# Patient Record
Sex: FEMALE | Race: WHITE | NOT HISPANIC OR LATINO | URBAN - METROPOLITAN AREA
[De-identification: names, ages, dates, MRNs, and addresses within clinical notes are randomized per-mention and may not be internally consistent; named-entity substitution may affect disease eponyms.]

---

## 2023-04-26 ENCOUNTER — EVALUATION (OUTPATIENT)
Dept: PHYSICAL THERAPY | Facility: MEDICAL CENTER | Age: 48
End: 2023-04-26

## 2023-04-26 DIAGNOSIS — M25.511 CHRONIC RIGHT SHOULDER PAIN: Primary | ICD-10-CM

## 2023-04-26 DIAGNOSIS — G89.29 CHRONIC RIGHT SHOULDER PAIN: Primary | ICD-10-CM
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
## 2023-05-03 ENCOUNTER — OFFICE VISIT (OUTPATIENT)
Dept: PHYSICAL THERAPY | Facility: MEDICAL CENTER | Age: 48
End: 2023-05-03

## 2023-05-03 DIAGNOSIS — M25.511 CHRONIC RIGHT SHOULDER PAIN: Primary | ICD-10-CM

## 2023-05-03 DIAGNOSIS — G89.29 CHRONIC RIGHT SHOULDER PAIN: Primary | ICD-10-CM

## 2023-05-03 NOTE — PROGRESS NOTES
"Daily Note     Today's date: 5/3/2023  Patient name: Jac Blandon  : 1975  MRN: [id number]  Referring provider: Ly Terry DO  Dx:   Encounter Diagnosis     ICD-10-CM    1  Chronic right shoulder pain  M25 511     G89 29           Start Time: 848  Stop Time: 930  Total time in clinic (min): 42 minutes    Subjective: pt reports that she was feeling good a couple days but reports increased soreness and numbness of her R hand upon arrival today  Objective: See treatment diary below      Assessment: Tolerated treatment well  Pt reported some tingling in her hand with PROM of the R shoulder  Mild discomfort noted with end range shoulder flexion  Periscapular and postural strengthening exercises completed with good tolerance but increased fatigue noted when targeting RTC musculature  HEP progressed and patient was educated about acceptable soreness levels  We will continue to progress as tolerated  Patient would benefit from continued PT      Plan: Continue per plan of care  Precautions:  Hx back surgery, ALLERGIC TO MEDICAL TAPE    Pt 1:1 from 850-740  Manuals 4/26 5/3           R shoulder PROM  RK                                                  Neuro Re-Ed             scap retract 5\"x10            Band rows  rtb 2x10 3\"           Band ext  rtb 2x10 3\"           band ER  rtb x10 3\"           Band IR  rtb x10 3\"           SL ER x10 2x10           Shoulder purturbations  30\"x2           Ther Ex             pulleys  5'           Supine cane flexion x10 x10           Seated cane abd             Wall slides                                                                 Ther Activity                                       Gait Training                                       Modalities                                            "

## 2023-05-09 ENCOUNTER — TELEPHONE (OUTPATIENT)
Dept: FAMILY MEDICINE CLINIC | Facility: CLINIC | Age: 48
End: 2023-05-09

## 2023-05-09 NOTE — TELEPHONE ENCOUNTER
I called patient and left message that we have Dr. Rufino Wise listed as her PCP and we have not seen her in a long time. If she wanted to keep Dr. Rufino Wise as PCP then please give the office a call and we can schedule a yearly wellness. If she does not want to come here or is seeing someone else to also please call the office so we can update.

## 2023-05-10 ENCOUNTER — OFFICE VISIT (OUTPATIENT)
Dept: PHYSICAL THERAPY | Facility: MEDICAL CENTER | Age: 48
End: 2023-05-10

## 2023-05-10 DIAGNOSIS — M25.511 CHRONIC RIGHT SHOULDER PAIN: Primary | ICD-10-CM

## 2023-05-10 DIAGNOSIS — G89.29 CHRONIC RIGHT SHOULDER PAIN: Primary | ICD-10-CM

## 2023-05-10 NOTE — PROGRESS NOTES
"Daily Note     Today's date: 5/10/2023  Patient name: Yaneli Snell  : 1975  MRN: [id number]  Referring provider: Emy Castelan DO  Dx:   Encounter Diagnosis     ICD-10-CM    1  Chronic right shoulder pain  M25 511     G89 29           Start Time: 845  Stop Time: 926  Total time in clinic (min): 41 minutes    Subjective: pt reports that she has increased shoulder discomfort the day after last session  She then had a couple good days with one or 2 bad days mixed in  She states this may be due to her sleeping position  She notes exercises initially were painful but have improved  Objective: See treatment diary below      Assessment: Tolerated treatment well  Pt completed all exercises well with continued complaints of R hand numbness mostly aggravated with above shoulder height activity  Pt noted that exercise tolerance improved with cueing of shoulder blade setting (retracted)  Pt educated about importance of posture and its affect of shoulder symptoms  HEP progressed with resistance and pt educated about when to progress  We will continue to progress as tolerated  Patient would benefit from continued PT      Plan: Continue per plan of care  Precautions:  Hx back surgery, ALLERGIC TO MEDICAL TAPE    Pt 1:1 from   Manuals  5/3 5/10          R shoulder PROM  RK RK                                                 Neuro Re-Ed             scap retract 5\"x10            Band rows  rtb 2x10 3\" rtb 2x10 3\"          Band ext  rtb 2x10 3\" rtb 2x10 3\"          band ER  rtb x10 3\" rtb 2x10 3\"          Band IR  rtb x10 3\" rtb 2x10 3\"          SL ER x10 2x10 2x10          Shoulder purturbations  30\"x2 30\"x2          Band Y   ytb 2x10                                    Ther Ex             pulleys  5' 5'          Supine cane flexion x10 x10 AROM x10 #0, x10 #1          Seated cane abd   scap 2x10          Wall slides                                                                 Ther Activity        " Gait Training                                       Modalities

## 2023-05-17 ENCOUNTER — OFFICE VISIT (OUTPATIENT)
Dept: PHYSICAL THERAPY | Facility: MEDICAL CENTER | Age: 48
End: 2023-05-17

## 2023-05-17 DIAGNOSIS — M25.511 CHRONIC RIGHT SHOULDER PAIN: Primary | ICD-10-CM

## 2023-05-17 DIAGNOSIS — G89.29 CHRONIC RIGHT SHOULDER PAIN: Primary | ICD-10-CM
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
## 2023-05-24 ENCOUNTER — OFFICE VISIT (OUTPATIENT)
Dept: PHYSICAL THERAPY | Facility: MEDICAL CENTER | Age: 48
End: 2023-05-24

## 2023-05-24 DIAGNOSIS — G89.29 CHRONIC RIGHT SHOULDER PAIN: Primary | ICD-10-CM

## 2023-05-24 DIAGNOSIS — M25.511 CHRONIC RIGHT SHOULDER PAIN: Primary | ICD-10-CM

## 2023-05-24 NOTE — LETTER
May 24, 2023    Karime LupeDO  [address] [facility]    Patient: Marisa Jason   YOB: 1975   Date of Visit: 2023     Encounter Diagnosis     ICD-10-CM    1  Chronic right shoulder pain  M25 511     G89 29           Dear Dr Galdamez Gain:    Thank you for your recent referral of Marisa Jason  Please review the attached evaluation summary from Adrienne's recent visit  Please verify that you agree with the plan of care by signing the attached order  If you have any questions or concerns, please do not hesitate to call  I sincerely appreciate the opportunity to share in the care of one of your patients and hope to have another opportunity to work with you in the near future  Sincerely,    Irma Amos, PT      Referring Provider:      I certify that I have read the below Plan of Care and certify the need for these services furnished under this plan of treatment while under my care  Karime Andrade   [id number] [facility] [address]  Via Fax: [phone]          PT Re-Evaluation     Today's date: 2023  Patient name: Marisa Jason  : 1975  MRN: [id number]  Referring provider: Felicity Key DO  Dx:   Encounter Diagnosis     ICD-10-CM    1  Chronic right shoulder pain  M25 511     G89 29           Start Time: 0845  Stop Time: 09  Total time in clinic (min): 35 minutes    Assessment  Assessment details: Pt is a 41 y o female who has completed 5 PT sessions to this date  The patient has made improvements in R shoulder ROM and improved R shoulder srtength  However, the patient continues to have increased difficulty with increased R shoulder and arm pain, decreased R shoulder and cervical ROM, decreased R UE strength and decreased activity tolerance  Pt continues to have inconsistent shoulder and R UE symptoms that are relatively unchanged  Pt has one more scheduled PT session with MD follow up scheduled for 2023    At this time, given minimal improvement noted with skilled PT, I believe this patient may benefit from additional testing of R UE symptoms  Following NV, we will place this patient on a hold  for 2 additional weeks  If there is no contact from this patient in this time, pt will be DC from PT  Impairments: abnormal or restricted ROM, abnormal movement, activity intolerance, impaired physical strength, lacks appropriate home exercise program, pain with function and poor posture     Symptom irritability: moderateUnderstanding of Dx/Px/POC: good   Prognosis: good    Goals  STGs: 4 weeks  1) Pt will have SPR decrease of 2 units at worst- not met  2) pt will have improved R shoulder ER strength to 4+/5- not met  3) pt will have improved foto score of 10 points- not met    LTGs: 8 weeks  1) pt will be independent with HEP by D/C- not met  2) pt will be independent with symptom management by D/C- not met  3) pt will subjectively report improved tolerance to reaching the top shelf of her cabinet with no more than 2/10 pain in the R shoulder in order to demonstrate improved activity tolerance by DC - not met    Plan  Patient would benefit from: skilled physical therapy  Planned modality interventions: cryotherapy and thermotherapy: hydrocollator packs  Planned therapy interventions: joint mobilization, manual therapy, neuromuscular re-education, patient education, postural training, strengthening, stretching, therapeutic activities, therapeutic exercise, home exercise program, graded activity and functional ROM exercises  Frequency: 2x week  Duration in weeks: 12  Plan of Care beginning date: 4/26/2023  Plan of Care expiration date: 7/19/2023  Treatment plan discussed with: patient        Subjective Evaluation    History of Present Illness  Mechanism of injury: Subjective Comments: pt reports that her symptoms are relatively the same    She reports that her symptoms are inconsistent in which she has a couple good days followed "with increased pain in the R forearm and hand with increased shoulder pain  She notes that her shoulder has  Improved somewhat  Pain   Rest: 2/10   Best: 2/10   Worst: 9/10            Objective     Palpation     Additional Palpation Details  No TTP    Active Range of Motion   Cervical/Thoracic Spine       Cervical  Subcranial protraction:  WFL   Subcranial retraction:  WFL   Flexion: 50 degrees   Extension: 60 degrees      Left lateral flexion: 40 degrees      Right lateral flexion: 35 degrees      Left rotation: 80 degrees  Right rotation: 58 degrees         Left Shoulder   Flexion: 164 degrees   Abduction: 172 degrees   External rotation BTH: T6   Internal rotation BTB: T4     Right Shoulder   Flexion: 157 degrees with pain  Abduction: 150 degrees with pain  External rotation BTH: T6   Internal rotation BTB: L3 with pain    Passive Range of Motion     Right Shoulder   Flexion: 165 degrees with pain  Abduction: 170 degrees with pain  External rotation 45°: 105 degrees with pain  Internal rotation 45°: 70 degrees with pain    Strength/Myotome Testing     Left Shoulder     Planes of Motion   Flexion: 4+   Abduction: 4+   External rotation at 0°: 4+   Internal rotation at 0°: 4+     Right Shoulder     Planes of Motion   Flexion: 4-   Abduction: 3+   External rotation at 0°: 4   Internal rotation at 0°: 4-             Precautions:  Hx back surgery, ALLERGIC TO MEDICAL TAPE    Pt 1:1 from [phone]  Manuals 4/26 5/3 5/10 5/17 5/24        R shoulder PROM  RK RK RK np                                               Neuro Re-Ed             scap retract 5\"x10            Band rows  rtb 2x10 3\" rtb 2x10 3\" rtb 2x10 3\" rtb 2x10 3\"        Band ext  rtb 2x10 3\" rtb 2x10 3\" rtb 2x10 3\" rtb 2x10 3\"        band ER  rtb x10 3\" rtb 2x10 3\" rtb 2x10 3\" rtb 2x10 3\"        Band IR  rtb x10 3\" rtb 2x10 3\" rtb 2x10 3\" rtb 2x10 3\"        SL ER x10 2x10 2x10 x20 #1 x20 #1        Shoulder purturbations  30\"x2 30\"x2 30\"x2         Band Y   ytb " "2x10                                    Ther Ex             pulleys  5' 5' 5' UBE retro 5'         Supine cane flexion x10 x10 AROM x10 #0, x10 #1 AROM x20 #1 AROM x20 #1        Seated cane abd   scap 2x10 scap 2x10         Wall slides             Supine punches    x10 x10 w/ plus 2x10 w/ plus        pec stretch    10\"x5                                   Ther Activity                                       Gait Training                                       Modalities                                                             "

## 2023-05-24 NOTE — PROGRESS NOTES
PT Re-Evaluation     Today's date: 2023  Patient name: Kasi Mayberry  : 1975  MRN: [id number]  Referring provider: Lili Matamoros DO  Dx:   Encounter Diagnosis     ICD-10-CM    1  Chronic right shoulder pain  M25 511     G89 29           Start Time: 845  Stop Time: 920  Total time in clinic (min): 35 minutes    Assessment  Assessment details: Pt is a 41 y o female who has completed 5 PT sessions to this date  The patient has made improvements in R shoulder ROM and improved R shoulder srtength  However, the patient continues to have increased difficulty with increased R shoulder and arm pain, decreased R shoulder and cervical ROM, decreased R UE strength and decreased activity tolerance  Pt continues to have inconsistent shoulder and R UE symptoms that are relatively unchanged  Pt has one more scheduled PT session with MD follow up scheduled for 2023  At this time, given minimal improvement noted with skilled PT, I believe this patient may benefit from additional testing of R UE symptoms  Following NV, we will place this patient on a hold  for 2 additional weeks  If there is no contact from this patient in this time, pt will be DC from PT     Impairments: abnormal or restricted ROM, abnormal movement, activity intolerance, impaired physical strength, lacks appropriate home exercise program, pain with function and poor posture     Symptom irritability: moderateUnderstanding of Dx/Px/POC: good   Prognosis: good    Goals  STGs: 4 weeks  1) Pt will have SPR decrease of 2 units at worst- not met  2) pt will have improved R shoulder ER strength to 4+/5- not met  3) pt will have improved foto score of 10 points- not met    LTGs: 8 weeks  1) pt will be independent with HEP by D/C- not met  2) pt will be independent with symptom management by D/C- not met  3) pt will subjectively report improved tolerance to reaching the top shelf of her cabinet with no more than 2/10 pain in the R shoulder in order to demonstrate improved activity tolerance by DC - not met    Plan  Patient would benefit from: skilled physical therapy  Planned modality interventions: cryotherapy and thermotherapy: hydrocollator packs  Planned therapy interventions: joint mobilization, manual therapy, neuromuscular re-education, patient education, postural training, strengthening, stretching, therapeutic activities, therapeutic exercise, home exercise program, graded activity and functional ROM exercises  Frequency: 2x week  Duration in weeks: 12  Plan of Care beginning date: 4/26/2023  Plan of Care expiration date: 7/19/2023  Treatment plan discussed with: patient        Subjective Evaluation    History of Present Illness  Mechanism of injury: Subjective Comments: pt reports that her symptoms are relatively the same  She reports that her symptoms are inconsistent in which she has a couple good days followed with increased pain in the R forearm and hand with increased shoulder pain  She notes that her shoulder has  Improved somewhat       Pain   Rest: 2/10   Best: 2/10   Worst: 9/10            Objective     Palpation     Additional Palpation Details  No TTP    Active Range of Motion   Cervical/Thoracic Spine       Cervical  Subcranial protraction:  WFL   Subcranial retraction:  WFL   Flexion: 50 degrees   Extension: 60 degrees      Left lateral flexion: 40 degrees      Right lateral flexion: 35 degrees      Left rotation: 80 degrees  Right rotation: 58 degrees         Left Shoulder   Flexion: 164 degrees   Abduction: 172 degrees   External rotation BTH: T6   Internal rotation BTB: T4     Right Shoulder   Flexion: 157 degrees with pain  Abduction: 150 degrees with pain  External rotation BTH: T6   Internal rotation BTB: L3 with pain    Passive Range of Motion     Right Shoulder   Flexion: 165 degrees with pain  Abduction: 170 degrees with pain  External rotation 45°: 105 degrees with pain  Internal rotation 45°: 70 degrees with "pain    Strength/Myotome Testing     Left Shoulder     Planes of Motion   Flexion: 4+   Abduction: 4+   External rotation at 0°: 4+   Internal rotation at 0°: 4+     Right Shoulder     Planes of Motion   Flexion: 4-   Abduction: 3+   External rotation at 0°: 4   Internal rotation at 0°: 4-              Precautions:  Hx back surgery, ALLERGIC TO MEDICAL TAPE    Pt 1:1 from [phone]  Manuals 4/26 5/3 5/10 5/17 5/24        R shoulder PROM  RK RK RK np                                               Neuro Re-Ed             scap retract 5\"x10            Band rows  rtb 2x10 3\" rtb 2x10 3\" rtb 2x10 3\" rtb 2x10 3\"        Band ext  rtb 2x10 3\" rtb 2x10 3\" rtb 2x10 3\" rtb 2x10 3\"        band ER  rtb x10 3\" rtb 2x10 3\" rtb 2x10 3\" rtb 2x10 3\"        Band IR  rtb x10 3\" rtb 2x10 3\" rtb 2x10 3\" rtb 2x10 3\"        SL ER x10 2x10 2x10 x20 #1 x20 #1        Shoulder purturbations  30\"x2 30\"x2 30\"x2         Band Y   ytb 2x10                                    Ther Ex             pulleys  5' 5' 5' UBE retro 5'         Supine cane flexion x10 x10 AROM x10 #0, x10 #1 AROM x20 #1 AROM x20 #1        Seated cane abd   scap 2x10 scap 2x10         Wall slides             Supine punches    x10 x10 w/ plus 2x10 w/ plus        pec stretch    10\"x5                                   Ther Activity                                       Gait Training                                       Modalities                                            "

## 2023-05-31 ENCOUNTER — OFFICE VISIT (OUTPATIENT)
Dept: PHYSICAL THERAPY | Facility: MEDICAL CENTER | Age: 48
End: 2023-05-31

## 2023-05-31 DIAGNOSIS — M25.511 CHRONIC RIGHT SHOULDER PAIN: Primary | ICD-10-CM

## 2023-05-31 DIAGNOSIS — G89.29 CHRONIC RIGHT SHOULDER PAIN: Primary | ICD-10-CM

## 2023-05-31 NOTE — PROGRESS NOTES
"Daily Note     Today's date: 2023  Patient name: Kleber Leblanc  : 1975  MRN: [id number]  Referring provider: Colby Lacey DO  Dx:   Encounter Diagnosis     ICD-10-CM    1  Chronic right shoulder pain  M25 511     G89 29           Start Time: 0900  Stop Time: 09  Total time in clinic (min): 35 minutes    Subjective: pt reports continuation of symptoms  She reports that her shoulder feels pretty good but her neck and hand are bothering her  Objective: See treatment diary below      Assessment: Tolerated treatment well  Pt completed all exercises well with good tolerance  Pt noted increased tingling in her hand but reports that this is baseline  Pt reports that she is feels her shoulder is stronger, but symptoms grossly remain unchanged  Pt will f/u with MD next week  We will hold on PT at this time secondary to minimal overall improvement noted with formal PT  Patient would benefit from continued PT      Plan: Continue per plan of care  Precautions:  Hx back surgery, ALLERGIC TO MEDICAL TAPE    Pt 1:1 from   Manuals 4/26 5/3 5/10 5/17 5/24 5/31       R shoulder PROM  RK RK RK np RK                                              Neuro Re-Ed             scap retract 5\"x10            Band rows  rtb 2x10 3\" rtb 2x10 3\" rtb 2x10 3\" rtb 2x10 3\" gtb 2x10 3\"       Band ext  rtb 2x10 3\" rtb 2x10 3\" rtb 2x10 3\" rtb 2x10 3\" gtb 2x10 3\"       band ER  rtb x10 3\" rtb 2x10 3\" rtb 2x10 3\" rtb 2x10 3\" gtb 2x10 3\"       Band IR  rtb x10 3\" rtb 2x10 3\" rtb 2x10 3\" rtb 2x10 3\" gtb 2x10 3\"       SL ER x10 2x10 2x10 x20 #1 x20 #1 x20 #1       Shoulder purturbations  30\"x2 30\"x2 30\"x2         Band Y   ytb 2x10                                    Ther Ex             pulleys  5' 5' 5' UBE retro 5'  UBE retro 5'        Supine cane flexion x10 x10 AROM x10 #0, x10 #1 AROM x20 #1 AROM x20 #1 AROM x20 #1 and seated       Seated cane abd   scap 2x10 scap 2x10         Wall slides             Supine punches    x10 x10 " "w/ plus 2x10 w/ plus 2x10 w/ plus #1       pec stretch    10\"x5                                   Ther Activity                                       Gait Training                                       Modalities                                            "

## 2023-09-08 NOTE — TELEPHONE ENCOUNTER
09/08/23 2:16 PM     The office's request has been received, reviewed, and the patient chart updated. The PCP has successfully been removed with a patient attribution note. This message will now be completed.     Thank you  Estrella Jenkins